# Patient Record
Sex: MALE | Race: WHITE | NOT HISPANIC OR LATINO | Employment: UNEMPLOYED | URBAN - METROPOLITAN AREA
[De-identification: names, ages, dates, MRNs, and addresses within clinical notes are randomized per-mention and may not be internally consistent; named-entity substitution may affect disease eponyms.]

---

## 2024-08-06 NOTE — PROGRESS NOTES
8/7/2024 CC: 43 y.o. presents for glaucoma evaluation.     HPI:   Pt here for glaucoma eval. Dx 6 months to 1 year ago. Using latanoprost qhs OU. Vision is stable from distance but smaller print difficult. No pain, no irritation, no redness. Pt last seen by Dr. Anders in January 2024.     Past ocular history: Glaucoma suspect for a year  Family history: no family history of glaucoma   Past medical history: denies diabetes   Social history: smoker    Eye medications:   Both eyes: latanoprost qhs OU, for 6 months    Testing:    HVF 24-2 8/7/2024: OD peripheral scatter, MD -3.0 dB, OS peripheral scatters vs ? Early NS MD -4.5 dB    OCT 8/7/2024: OD full, avg 90 um, OS bdl T, avg 94 um    Pachymetry 8/7/2024: 617/620    Gonioscopy 8/7/2024: open OU    Tmax: Pending    Assessment:   Glaucoma suspect vs ocular hypertension (OHT)  - No Fhx  - Thick cornea  - large symmetrical CDR 0.5  - OCT wnl, HVF peripheral scatters  - IOP today 18/16 with latanoprost  - IOP target: normal  - PDS, +4 pigments  - GS based on CDR and probably 20s IOP, PDS  - Uses Latanoprost qhs OU     Plan:   I explained my findings. GS, thick corneas w/ normal testing    Eye medications:     Both eyes: continue Latanoprost qhs     Return in 3 months w/ IOP check

## 2024-08-07 ENCOUNTER — APPOINTMENT (OUTPATIENT)
Dept: OPHTHALMOLOGY | Facility: CLINIC | Age: 44
End: 2024-08-07
Payer: COMMERCIAL

## 2024-08-07 DIAGNOSIS — H40.1330 PIGMENTARY GLAUCOMA OF BOTH EYES, UNSPECIFIED GLAUCOMA STAGE: ICD-10-CM

## 2024-08-07 PROBLEM — R11.2 INCREASED NAUSEA AND VOMITING: Status: ACTIVE | Noted: 2023-10-14

## 2024-08-07 PROCEDURE — 92133 CPTRZD OPH DX IMG PST SGM ON: CPT | Performed by: OPHTHALMOLOGY

## 2024-08-07 PROCEDURE — 99204 OFFICE O/P NEW MOD 45 MIN: CPT | Performed by: OPHTHALMOLOGY

## 2024-08-07 PROCEDURE — 92083 EXTENDED VISUAL FIELD XM: CPT | Performed by: OPHTHALMOLOGY

## 2024-08-07 RX ORDER — LATANOPROST 50 UG/ML
SOLUTION/ DROPS OPHTHALMIC
COMMUNITY

## 2024-08-07 RX ORDER — MAGNESIUM GLUCONATE 30 MG(550)
30 TABLET ORAL 2 TIMES DAILY
COMMUNITY

## 2024-08-07 ASSESSMENT — CUP TO DISC RATIO
OD_RATIO: 0.5
OS_RATIO: 0.45

## 2024-08-07 ASSESSMENT — TONOMETRY
OD_IOP_MMHG: 18
IOP_METHOD: GOLDMANN APPLANATION
OS_IOP_MMHG: 16

## 2024-08-07 ASSESSMENT — GONIOSCOPY
OS_INFERIOR: CBB
OD_NASAL: CBB
OS_TEMPORAL: CBB
OD_SUPERIOR: CBB
OS_SUPERIOR: CBB
OD_INFERIOR: CBB
OD_TEMPORAL: CBB
OS_NASAL: CBB

## 2024-08-07 ASSESSMENT — VISUAL ACUITY
OS_CC: 20/25-2
METHOD: SNELLEN - LINEAR
OS_SC: 20/30
OD_SC: 20/30
OS_PH_SC: 20/25
OD_CC: 20/25-2

## 2024-08-07 ASSESSMENT — SLIT LAMP EXAM - LIDS
COMMENTS: NORMAL
COMMENTS: NORMAL

## 2024-08-07 ASSESSMENT — REFRACTION_MANIFEST
OS_SPHERE: +0.00
OS_CYLINDER: +0.75
OS_AXIS: 080
OD_SPHERE: +0.25
METHOD_AUTOREFRACTION: 1
OD_AXIS: 065
OD_CYLINDER: +0.50

## 2024-08-07 ASSESSMENT — PACHYMETRY
OS_CT(UM): 620
EXAM_DATE: 8/7/2024
OD_CT(UM): 617

## 2024-08-07 ASSESSMENT — EXTERNAL EXAM - RIGHT EYE: OD_EXAM: NORMAL

## 2024-08-07 ASSESSMENT — EXTERNAL EXAM - LEFT EYE: OS_EXAM: NORMAL

## 2024-08-13 DIAGNOSIS — H40.1330 PIGMENTARY GLAUCOMA OF BOTH EYES, UNSPECIFIED GLAUCOMA STAGE: Primary | ICD-10-CM

## 2024-08-13 RX ORDER — LATANOPROST 50 UG/ML
1 SOLUTION/ DROPS OPHTHALMIC NIGHTLY
Qty: 7.5 ML | Refills: 3 | Status: SHIPPED | OUTPATIENT
Start: 2024-08-13

## 2024-09-19 ENCOUNTER — APPOINTMENT (OUTPATIENT)
Dept: OPHTHALMOLOGY | Facility: CLINIC | Age: 44
End: 2024-09-19
Payer: MEDICAID

## 2024-11-06 ENCOUNTER — APPOINTMENT (OUTPATIENT)
Dept: OPHTHALMOLOGY | Facility: CLINIC | Age: 44
End: 2024-11-06
Payer: COMMERCIAL

## 2024-11-06 DIAGNOSIS — H40.003 GLAUCOMA SUSPECT OF BOTH EYES: Primary | ICD-10-CM

## 2024-11-06 PROCEDURE — 92134 CPTRZ OPH DX IMG PST SGM RTA: CPT | Performed by: OPHTHALMOLOGY

## 2024-11-06 PROCEDURE — 99213 OFFICE O/P EST LOW 20 MIN: CPT | Performed by: OPHTHALMOLOGY

## 2024-11-06 ASSESSMENT — ENCOUNTER SYMPTOMS
ENDOCRINE NEGATIVE: 0
CONSTITUTIONAL NEGATIVE: 0
ALLERGIC/IMMUNOLOGIC NEGATIVE: 0
GASTROINTESTINAL NEGATIVE: 0
PSYCHIATRIC NEGATIVE: 0
EYES NEGATIVE: 1
RESPIRATORY NEGATIVE: 0
MUSCULOSKELETAL NEGATIVE: 0
HEMATOLOGIC/LYMPHATIC NEGATIVE: 0
CARDIOVASCULAR NEGATIVE: 0
NEUROLOGICAL NEGATIVE: 0

## 2024-11-06 ASSESSMENT — CONF VISUAL FIELD
OD_SUPERIOR_TEMPORAL_RESTRICTION: 0
OD_INFERIOR_TEMPORAL_RESTRICTION: 0
OD_SUPERIOR_NASAL_RESTRICTION: 0
OS_INFERIOR_NASAL_RESTRICTION: 0
OS_SUPERIOR_TEMPORAL_RESTRICTION: 0
OS_SUPERIOR_NASAL_RESTRICTION: 0
OS_NORMAL: 1
OD_INFERIOR_NASAL_RESTRICTION: 0
OD_NORMAL: 1
OS_INFERIOR_TEMPORAL_RESTRICTION: 0

## 2024-11-06 ASSESSMENT — CUP TO DISC RATIO
OS_RATIO: 0.45
OD_RATIO: 0.5

## 2024-11-06 ASSESSMENT — EXTERNAL EXAM - RIGHT EYE: OD_EXAM: NORMAL

## 2024-11-06 ASSESSMENT — SLIT LAMP EXAM - LIDS
COMMENTS: NORMAL
COMMENTS: NORMAL

## 2024-11-06 ASSESSMENT — VISUAL ACUITY
OD_SC: 20/20
OD_SC+: -1
METHOD: SNELLEN - LINEAR
OS_SC: 20/25

## 2024-11-06 ASSESSMENT — TONOMETRY
OS_IOP_MMHG: 20
OS_IOP_MMHG: 21
OD_IOP_MMHG: 18
OD_IOP_MMHG: 18
IOP_METHOD: GOLDMANN APPLANATION
IOP_METHOD: TONOPEN

## 2024-11-06 ASSESSMENT — PACHYMETRY
OD_CT(UM): 617
OS_CT(UM): 620
EXAM_DATE: 8/7/2024

## 2024-11-06 ASSESSMENT — EXTERNAL EXAM - LEFT EYE: OS_EXAM: NORMAL

## 2024-12-19 ENCOUNTER — HOSPITAL ENCOUNTER (EMERGENCY)
Facility: HOSPITAL | Age: 44
Discharge: HOME | End: 2024-12-19
Attending: EMERGENCY MEDICINE
Payer: COMMERCIAL

## 2024-12-19 VITALS
WEIGHT: 155 LBS | TEMPERATURE: 97.7 F | OXYGEN SATURATION: 100 % | DIASTOLIC BLOOD PRESSURE: 82 MMHG | SYSTOLIC BLOOD PRESSURE: 136 MMHG | HEART RATE: 83 BPM | BODY MASS INDEX: 23.49 KG/M2 | RESPIRATION RATE: 18 BRPM | HEIGHT: 68 IN

## 2024-12-19 DIAGNOSIS — F10.11 HISTORY OF ALCOHOL ABUSE: Primary | ICD-10-CM

## 2024-12-19 PROCEDURE — 99281 EMR DPT VST MAYX REQ PHY/QHP: CPT | Performed by: EMERGENCY MEDICINE

## 2024-12-19 ASSESSMENT — COLUMBIA-SUICIDE SEVERITY RATING SCALE - C-SSRS
6. HAVE YOU EVER DONE ANYTHING, STARTED TO DO ANYTHING, OR PREPARED TO DO ANYTHING TO END YOUR LIFE?: NO
2. HAVE YOU ACTUALLY HAD ANY THOUGHTS OF KILLING YOURSELF?: NO
1. IN THE PAST MONTH, HAVE YOU WISHED YOU WERE DEAD OR WISHED YOU COULD GO TO SLEEP AND NOT WAKE UP?: NO

## 2024-12-19 ASSESSMENT — PAIN SCALES - GENERAL: PAINLEVEL_OUTOF10: 0 - NO PAIN

## 2024-12-19 NOTE — ED NOTES
This nurse spoke with thien dutton about court ordered alcohol assesments. We do not do them here at [facility] but [facility] does on Saturdays. This patient was instructed to do this and to explain to his / the situation. Dr. Root will also give discharge instructions to prove he came here to get assessment.      Ronald Roman RN  12/19/24 1000    
Unable to print discharge papers. Landon states he doesn't need them and will follow up at next appt. With . Notifed that he needs to call [facility] for alcohol assesment. Patient aware.     Ronald Roman RN  12/19/24 [id number]    
stretcher

## 2024-12-19 NOTE — ED PROVIDER NOTES
EMERGENCY DEPARTMENT ENCOUNTER      Pt Name: Sunday Mccollum  MRN: [id number]  Birthdate 1980  Date of evaluation: 12/19/2024  ED Provider: Anastacia Root DO     CHIEF COMPLAINT       Chief Complaint   Patient presents with    Drug / Alcohol Assessment     Patient is here for court ordered alcohol assessment. Ordered by        HISTORY OF PRESENT ILLNESS    Sunday Mccollum is a 44 y.o. who presents to the emergency department requesting a drug and alcohol assessment.  He states that he had to go to court in August after alcohol related charges and he has been sober since that time.  He was given a list by his  of authorized drug and alcohol assessment centers 1 of which was the emergency department.  He presents now for evaluation.  He has no current complaints.  He does not want detox.  He states he has abstained from alcohol for the past 5 months.    REVIEW OF SYSTEMS     Focused ROS performed and negative other than as listed in HPI    PAST MEDICAL HISTORY   No past medical history on file.    SURGICAL HISTORY     No past surgical history on file.    CURRENT MEDICATIONS       Previous Medications    LATANOPROST (XALATAN) 0.005 % OPHTHALMIC SOLUTION    Administer 1 drop into both eyes once daily at bedtime.    MAGNESIUM GLUCONATE 30 MG (550 MG) TABLET    Take 1 tablet (30 mg) by mouth twice a day.       ALLERGIES     Patient has no known allergies.    FAMILY HISTORY     No family history on file.     SOCIAL HISTORY       Social History     Socioeconomic History    Marital status: Single     Social Drivers of Health     Food Insecurity: Unknown (3/29/2024)    Received from [facility] Vital Sign     Worried About Running Out of Food in the Last Year: Never true       PHYSICAL EXAM       ED Triage Vitals [12/19/24 0930]   Temperature Heart Rate Respirations BP   36.5 °C (97.7 °F) 83 18 136/82      Pulse Ox Temp Source Heart Rate Source Patient Position   100 % Temporal  Monitor Sitting      BP Location FiO2 (%)     Left arm --        General: Appears well, no acute distress, alert  Head: Head atraumatic; normocephalic  Eyes: normal inspection; no icterus  ENT: mucosa moist without lesion  Neck: Normal inspection, no meningeal signs  Resp: Normal breath sounds, no wheeze or crackles; No respiratory distress  Chest Wall: no tenderness or deformity  Heart: Heart rate and rhythm regular; No Murmurs  MSK: Normal appearance; Moves all extremities; No Pedal edema  Neuro: Alert; no focal deficits, moves all extremities  Psych: Mood and Affect normal  Skin: Color appropriate; warm; Dry    EMERGENCY DEPARTMENT COURSE/MDM   The patient does not request detox did not feel lab work or other workup is indicated at this time.  [facility] was contacted who states that they will do the court ordered drug and alcohol assessments on Saturdays only.  Patient will follow-up at that time.  He declines further medical evaluation.    Diagnoses as of 12/19/24 1025   History of alcohol abuse       Meds Administered:  Medications - No data to display    PROCEDURES   Unless otherwise noted below, none  Procedures      FINAL IMPRESSION      1. History of alcohol abuse          DISPOSITION    Discharge 12/19/2024 09:59:40 AM  As a result of the work-up, the patient was discharged home.  he was informed of his diagnosis and instructed to come back with any concerns or worsening of condition.  he and was agreeable to the plan as discussed above.  he was given the opportunity to ask questions.  All of the patient's questions were answered.    Critical Care time: Not Indicated    (Comment: Please note this report has been produced using speech recognition software and may contain errors related to that system including errors in grammar, punctuation, and spelling, as well as words and phrases that may be inappropriate.  If there are any questions or concerns please feel free to contact the dictating  provider for clarification.)    Anastacia Root DO (electronically signed)  Emergency Medicine Physician             Anastacia Root DO  12/19/24 1022

## 2025-01-22 DIAGNOSIS — H40.1330 PIGMENTARY GLAUCOMA OF BOTH EYES, UNSPECIFIED GLAUCOMA STAGE: Primary | ICD-10-CM

## 2025-01-22 RX ORDER — LATANOPROST 50 UG/ML
1 SOLUTION/ DROPS OPHTHALMIC NIGHTLY
Qty: 7.5 ML | Refills: 3 | Status: SHIPPED | OUTPATIENT
Start: 2025-01-22

## 2025-03-12 DIAGNOSIS — H40.1330 PIGMENTARY GLAUCOMA OF BOTH EYES, UNSPECIFIED GLAUCOMA STAGE: Primary | ICD-10-CM

## 2025-03-12 RX ORDER — LATANOPROST 50 UG/ML
1 SOLUTION/ DROPS OPHTHALMIC NIGHTLY
Qty: 7.5 ML | Refills: 3 | Status: SHIPPED | OUTPATIENT
Start: 2025-03-12

## 2025-03-12 RX ORDER — LATANOPROST 50 UG/ML
1 SOLUTION/ DROPS OPHTHALMIC NIGHTLY
Qty: 7.5 ML | Refills: 3 | Status: SHIPPED | OUTPATIENT
Start: 2025-03-12 | End: 2025-03-12 | Stop reason: SDUPTHER

## 2025-05-15 ENCOUNTER — APPOINTMENT (OUTPATIENT)
Dept: OPHTHALMOLOGY | Facility: CLINIC | Age: 45
End: 2025-05-15
Payer: COMMERCIAL

## 2025-05-15 DIAGNOSIS — H40.053 BILATERAL OCULAR HYPERTENSION: Primary | ICD-10-CM

## 2025-05-15 PROCEDURE — 99214 OFFICE O/P EST MOD 30 MIN: CPT | Performed by: OPHTHALMOLOGY

## 2025-05-15 ASSESSMENT — ENCOUNTER SYMPTOMS
CARDIOVASCULAR NEGATIVE: 0
CONSTITUTIONAL NEGATIVE: 0
EYES NEGATIVE: 1
ALLERGIC/IMMUNOLOGIC NEGATIVE: 0
ENDOCRINE NEGATIVE: 0
PSYCHIATRIC NEGATIVE: 0
MUSCULOSKELETAL NEGATIVE: 0
RESPIRATORY NEGATIVE: 0
GASTROINTESTINAL NEGATIVE: 0
NEUROLOGICAL NEGATIVE: 0
HEMATOLOGIC/LYMPHATIC NEGATIVE: 0

## 2025-05-15 ASSESSMENT — PACHYMETRY
OS_CT(UM): 620
OD_CT(UM): 617
EXAM_DATE: 8/7/2024

## 2025-05-15 ASSESSMENT — CONF VISUAL FIELD
OS_INFERIOR_TEMPORAL_RESTRICTION: 0
OS_NORMAL: 1
OD_SUPERIOR_NASAL_RESTRICTION: 0
OD_SUPERIOR_TEMPORAL_RESTRICTION: 0
OS_SUPERIOR_NASAL_RESTRICTION: 0
OD_INFERIOR_NASAL_RESTRICTION: 0
OD_NORMAL: 1
OS_INFERIOR_NASAL_RESTRICTION: 0
OD_INFERIOR_TEMPORAL_RESTRICTION: 0
OS_SUPERIOR_TEMPORAL_RESTRICTION: 0

## 2025-05-15 ASSESSMENT — VISUAL ACUITY
METHOD: SNELLEN - LINEAR
OD_SC+: -1
OD_SC: 20/20
OS_SC: 20/20

## 2025-05-15 ASSESSMENT — TONOMETRY
OS_IOP_MMHG: 18
OD_IOP_MMHG: 18
IOP_METHOD: GOLDMANN APPLANATION

## 2025-05-15 ASSESSMENT — CUP TO DISC RATIO
OS_RATIO: 0.45
OD_RATIO: 0.4

## 2025-05-15 ASSESSMENT — SLIT LAMP EXAM - LIDS
COMMENTS: NORMAL
COMMENTS: NORMAL

## 2025-05-15 ASSESSMENT — EXTERNAL EXAM - LEFT EYE: OS_EXAM: NORMAL

## 2025-05-15 ASSESSMENT — EXTERNAL EXAM - RIGHT EYE: OD_EXAM: NORMAL

## 2025-05-15 NOTE — PROGRESS NOTES
Family hx -  Trauma -  Tmax 20s  Not recorded        Not recorded             Previous procedures:  none    Pathophysiology of glaucoma potential blinding nature of disease reviewed  Importance of f/u and compliance stressed    Current Outpatient Medications (Ophthalmology pharm classes)   Medication Sig Dispense Refill    latanoprost (Xalatan) 0.005 % ophthalmic solution Administer 1 drop into both eyes once daily at bedtime. 7.5 mL 3     Current Outpatient Medications (Other)   Medication Sig Dispense Refill    magnesium gluconate 30 mg (550 mg) tablet Take 1 tablet (30 mg) by mouth twice a day.          Impression from today disease is stable    Ocular htn vs early primary open angle glaucoma (POAG)  IOP well controlled on qhs latanoprost  Goal IOP <21    Rtc 6 month(s)  IOP check and HVF 24-2

## 2025-11-20 ENCOUNTER — APPOINTMENT (OUTPATIENT)
Dept: OPHTHALMOLOGY | Facility: CLINIC | Age: 45
End: 2025-11-20
Payer: COMMERCIAL